# Patient Record
(demographics unavailable — no encounter records)

---

## 2025-03-04 NOTE — IMAGING
[de-identified] : On examination of her right elbow she has no erythema, no swelling, no ecchymosis.  She is tender to palpation over the medial epicondyle.  No tenderness over the lateral epicondyle.  No tenderness over the olecranon or the radial head.  She has full range of motion.  She has some pain with full extension.  Negative Tinel's.  No tenderness palpation of the forearm wrist or hand.  Sensation is intact throughout, 2+ radial pulse.  X-rays taken in the office today of the right elbow show no fractures, dislocations, or other bony abnormalities.

## 2025-03-04 NOTE — HISTORY OF PRESENT ILLNESS
[de-identified] : 51-year-old female is here today for evaluation of her right elbow.  Patient states been having pain in the right elbow for the last 2 weeks.  No injury or trauma.  She states pain is worse with lifting and certain movements.  She states occasionally she feels some tingling into her pinky and ring finger.  She denies any pain in the forearm wrist or hand.

## 2025-03-04 NOTE — DISCUSSION/SUMMARY
[de-identified] : At this time I recommend she get a tennis elbow strap brace.  Warm compresses.  I sent a prescription for an anti-inflammatory to the pharmacy.  I recommend she start doing physical therapy.  Will see her back in 8 weeks for repeat evaluation if she is still having pain. Patient will call me if any other problems or concerns.  Patient verbalized understanding and agreed with the plan, all questions were answered in the office today.